# Patient Record
Sex: MALE | Race: WHITE | Employment: FULL TIME | URBAN - METROPOLITAN AREA
[De-identification: names, ages, dates, MRNs, and addresses within clinical notes are randomized per-mention and may not be internally consistent; named-entity substitution may affect disease eponyms.]

---

## 2017-03-02 ENCOUNTER — OFFICE VISIT (OUTPATIENT)
Dept: FAMILY MEDICINE CLINIC | Facility: CLINIC | Age: 37
End: 2017-03-02

## 2017-03-02 VITALS
RESPIRATION RATE: 16 BRPM | DIASTOLIC BLOOD PRESSURE: 77 MMHG | SYSTOLIC BLOOD PRESSURE: 120 MMHG | HEART RATE: 78 BPM | OXYGEN SATURATION: 98 % | TEMPERATURE: 98 F

## 2017-03-02 DIAGNOSIS — J06.9 UPPER RESPIRATORY VIRUS: Primary | ICD-10-CM

## 2017-03-02 DIAGNOSIS — J02.9 SORE THROAT: ICD-10-CM

## 2017-03-02 LAB
CONTROL LINE PRESENT WITH A CLEAR BACKGROUND (YES/NO): YES YES/NO
STREP GRP A CUL-SCR: NEGATIVE

## 2017-03-02 PROCEDURE — 99203 OFFICE O/P NEW LOW 30 MIN: CPT | Performed by: NURSE PRACTITIONER

## 2017-03-02 PROCEDURE — 87880 STREP A ASSAY W/OPTIC: CPT | Performed by: NURSE PRACTITIONER

## 2017-03-02 NOTE — PROGRESS NOTES
CHIEF COMPLAINT:   Patient presents with:  Chest Congestion: runny nose, headache, sore troat. took halls, nuquil, dayquil.  appetite normal. drinking ok. has had strep before, no known exposure        HPI:   Micki León is a 40year old male prese LYMPH: No anterior cervical. No submandibular lymphadenopathy. No posterior cervical or occipital lymphadenopathy.       Recent Results (from the past 24 hour(s))  -STREP A ASSAY W/OPTIC   Collection Time: 03/02/17  4:36 PM   Result Value Ref Range   STREP · You may use acetaminophen or ibuprofen to control pain and fever, unless another medicine was prescribed.  (Note: If you have chronic liver or kidney disease, have ever had a stomach ulcer or gastrointestinal bleeding, or are taking blood-thinning medicin

## 2017-03-06 ENCOUNTER — OFFICE VISIT (OUTPATIENT)
Dept: FAMILY MEDICINE CLINIC | Facility: CLINIC | Age: 37
End: 2017-03-06

## 2017-03-06 VITALS
SYSTOLIC BLOOD PRESSURE: 110 MMHG | DIASTOLIC BLOOD PRESSURE: 68 MMHG | OXYGEN SATURATION: 97 % | HEART RATE: 89 BPM | TEMPERATURE: 100 F | RESPIRATION RATE: 18 BRPM

## 2017-03-06 DIAGNOSIS — J01.00 ACUTE MAXILLARY SINUSITIS, RECURRENCE NOT SPECIFIED: Primary | ICD-10-CM

## 2017-03-06 PROCEDURE — 99213 OFFICE O/P EST LOW 20 MIN: CPT | Performed by: NURSE PRACTITIONER

## 2017-03-06 RX ORDER — AMOXICILLIN AND CLAVULANATE POTASSIUM 875; 125 MG/1; MG/1
1 TABLET, FILM COATED ORAL 2 TIMES DAILY
Qty: 20 TABLET | Refills: 0 | Status: SHIPPED | OUTPATIENT
Start: 2017-03-06 | End: 2017-03-16

## 2017-03-06 NOTE — PROGRESS NOTES
CHIEF COMPLAINT:   Patient presents with:  URI: for 1 week, now having temp and sinus pressure/pain      HPI:   Bess Ralph is a 40year old male who presents for cold symptoms for  7  days.  Symptoms have progressed into sinus congestion and been wo LUNGS: clear to auscultation bilaterally, no wheezes or rhonchi. Breathing is non labored. CARDIO: RRR without murmur  EXTREMITIES: no cyanosis, clubbing or edema  LYMPH:  + anterior cervical lymphadenopathy.         ASSESSMENT AND PLAN:   Cam Gonzalez · Long-term nasal swelling and congestion not caused by allergies  · Blockage in the nose  Symptoms of ABRS  The symptoms of ABRS may be different for each person, and can include:  · Nasal congestion  · Runny nose  · Fluid draining from the nose down the · Symptoms that don’t get better after 3 to 5 days on antibiotics  · Trouble seeing  · Swelling around your eyes  · Confusion or trouble staying awake   Date Last Reviewed: 3/3/2015  © 7321-4732 [employer] [address] [place] [address], Jey Khan

## 2018-08-18 ENCOUNTER — OFFICE VISIT (OUTPATIENT)
Dept: FAMILY MEDICINE CLINIC | Facility: CLINIC | Age: 38
End: 2018-08-18
Payer: COMMERCIAL

## 2018-08-18 VITALS
RESPIRATION RATE: 22 BRPM | DIASTOLIC BLOOD PRESSURE: 100 MMHG | HEART RATE: 88 BPM | SYSTOLIC BLOOD PRESSURE: 148 MMHG | WEIGHT: 193 LBS | TEMPERATURE: 99 F | OXYGEN SATURATION: 98 %

## 2018-08-18 DIAGNOSIS — R11.0 NAUSEA: Primary | ICD-10-CM

## 2018-08-18 DIAGNOSIS — R03.0 ELEVATED BLOOD PRESSURE READING: ICD-10-CM

## 2018-08-18 PROCEDURE — 99213 OFFICE O/P EST LOW 20 MIN: CPT

## 2018-08-18 RX ORDER — ASPIRIN 81 MG/1
81 TABLET, CHEWABLE ORAL
COMMUNITY
Start: 2017-12-18

## 2018-08-18 RX ORDER — ATORVASTATIN CALCIUM 80 MG/1
80 TABLET, FILM COATED ORAL
COMMUNITY
Start: 2017-12-17

## 2018-08-18 RX ORDER — OMEPRAZOLE 40 MG/1
40 CAPSULE, DELAYED RELEASE ORAL DAILY
Qty: 90 CAPSULE | Refills: 3 | Status: CANCELLED | OUTPATIENT
Start: 2018-08-18 | End: 2019-08-13

## 2018-08-19 NOTE — PROGRESS NOTES
Foye Riedel is a 45year old male who presents with .     HPI:   Symptoms for <1 days    Stools described as noraml   Number of stools: 1 today   Blood in stools: no     Number of times vomited 0 but felt nauseate  Recent antibiotic use no  Abdominal Wt 193 lb   SpO2 98%  Repeat B/P at end of visit 138/84    GENERAL: well developed; well nourished; in no apparent distress  SKIN: no rashes; no suspicious lesions  HEENT: Mucous membranes moist but thicker secretions  NECK: supple; no adenopathy  LUNGS: c

## 2018-08-20 ENCOUNTER — OFFICE VISIT (OUTPATIENT)
Dept: FAMILY MEDICINE CLINIC | Facility: CLINIC | Age: 38
End: 2018-08-20
Payer: COMMERCIAL

## 2018-08-20 VITALS
OXYGEN SATURATION: 98 % | DIASTOLIC BLOOD PRESSURE: 74 MMHG | RESPIRATION RATE: 20 BRPM | SYSTOLIC BLOOD PRESSURE: 138 MMHG | TEMPERATURE: 98 F | HEART RATE: 80 BPM

## 2018-08-20 DIAGNOSIS — K30 STOMACH UPSET: Primary | ICD-10-CM

## 2018-08-20 PROBLEM — C81.90 HODGKIN'S LYMPHOMA (HCC): Status: ACTIVE | Noted: 2018-08-20

## 2018-08-20 PROBLEM — Z98.61 POSTSURGICAL PERCUTANEOUS TRANSLUMINAL CORONARY ANGIOPLASTY STATUS: Status: ACTIVE | Noted: 2018-01-05

## 2018-08-20 PROCEDURE — 99213 OFFICE O/P EST LOW 20 MIN: CPT

## 2018-08-20 NOTE — PROGRESS NOTES
Brock Chavez is a 45year old male who presents with .     HPI:   Symptoms on and off for 3 days    Stools described as Normal   Number of stools: 1/day   Blood in stools: no     Number of times vomited 0  Recent antibiotic use no  Abdominal pain: LUQ ache  NEURO: denies headaches      EXAM:   /74   Pulse 80   Temp 98 °F (36.7 °C) (Oral)   Resp 20   SpO2 98%   There is no height or weight on file to calculate BMI.    GENERAL: well developed; well nourished; in no apparent distress  SKIN: no rashes;

## (undated) NOTE — MR AVS SNAPSHOT
[address] [address] [phone]               Thank you for choosing us for your health care visit with Nino Cook NP.   We are glad to serve you and happy to provide you with this summary of your cold and cough for longer than 10 to 14 days. Your health care provider will ask about your symptoms and your medical history. The provider will check your vital signs, including your temperature.  You’ll have a physical exam. The health care provider will Current Medications          This list is accurate as of: 3/6/17  5:25 PM.  Always use your most recent med list.                Amoxicillin-Pot Clavulanate 875-125 MG Tabs   Take 1 tablet by mouth 2 (two) times daily.    Commonly known as:  AUGMENTIN Eat plenty of low-fat dairy products High fat meats and dairy   Choose whole grain products Foods high in sodium   Water is best for hydration Fast food.    Eat at home when possible     Tips for increasing your physical activity – Adults who are physically

## (undated) NOTE — MR AVS SNAPSHOT
[facility]  [facility]  [phone]               Thank you for choosing us for your health care visit with Kerri Robert NP.   We are glad to serve you and happy to provide you with this summary of your vis another medicine was prescribed.  (Note: If you have chronic liver or kidney disease, have ever had a stomach ulcer or gastrointestinal bleeding, or are taking blood-thinning medicines, talk with your healthcare provider before using these medicines.) Aspir You have not been prescribed any medications.             Results of Recent Testing     STREP A ASSAY W/OPTIC      Component Value Standard Range & Units    STREP GRP A CUL-SCR Negative Negative    Control Line Present with a clear background (yes/no) Yes

## (undated) NOTE — Clinical Note
Date: 3/2/2017    Patient Name: Hong Cosme          To Whom it may concern: This letter has been written at the patient's request. The above patient was seen at the [facility] for treatment of a medical condition.       The patient